# Patient Record
Sex: MALE | Race: BLACK OR AFRICAN AMERICAN | Employment: FULL TIME | URBAN - METROPOLITAN AREA
[De-identification: names, ages, dates, MRNs, and addresses within clinical notes are randomized per-mention and may not be internally consistent; named-entity substitution may affect disease eponyms.]

---

## 2021-09-01 ENCOUNTER — HOSPITAL ENCOUNTER (EMERGENCY)
Age: 56
Discharge: HOME | End: 2021-09-01
Attending: STUDENT IN AN ORGANIZED HEALTH CARE EDUCATION/TRAINING PROGRAM
Payer: MEDICAID

## 2021-09-01 VITALS
TEMPERATURE: 98 F | DIASTOLIC BLOOD PRESSURE: 86 MMHG | OXYGEN SATURATION: 100 % | SYSTOLIC BLOOD PRESSURE: 143 MMHG | RESPIRATION RATE: 18 BRPM | HEART RATE: 103 BPM

## 2021-09-01 DIAGNOSIS — R41.82 ALTERED MENTAL STATUS, UNSPECIFIED ALTERED MENTAL STATUS TYPE: Primary | ICD-10-CM

## 2021-09-01 PROCEDURE — 99281 EMR DPT VST MAYX REQ PHY/QHP: CPT

## 2021-09-01 NOTE — DISCHARGE INSTRUCTIONS
You presented to the ED with EMS with reported altered mental status that was observed by her mother. You had no signs of altered mental status while in the ED. Able to walk without difficulty. You are offered lab work, EKG and further work-up but you declined. As you are asymptomatic at this time discharge is appropriate. If you have return of symptoms or any other concerning changes in your health please return to the ED for further evaluation.

## 2021-09-01 NOTE — ED PROVIDER NOTES
Patient is a 78-year-old male presenting to the ED with EMS after his mother called EMS due to patient's altered mental status. Patient ANO x4 in the ED. Denies any pain or other concerns. Patient states he wants to go home. Patient's mother was contacted via phone and stated that he had an episode of confusion and unresponsiveness although he was awake at the time. Patient appears at baseline at this time    The history is provided by the patient and a parent. Altered mental status   This is a new problem. The current episode started less than 1 hour ago. The problem has been resolved. Associated symptoms include confusion and somnolence. Mental status baseline is normal.  Risk factors: Recent hospitalization. His past medical history does not include seizures, hypertension or head trauma. History reviewed. No pertinent past medical history. No past surgical history on file. History reviewed. No pertinent family history. Social History     Socioeconomic History    Marital status: Not on file     Spouse name: Not on file    Number of children: Not on file    Years of education: Not on file    Highest education level: Not on file   Occupational History    Not on file   Tobacco Use    Smoking status: Not on file   Substance and Sexual Activity    Alcohol use: Not on file    Drug use: Not on file    Sexual activity: Not on file   Other Topics Concern    Not on file   Social History Narrative    Not on file     Social Determinants of Health     Financial Resource Strain:     Difficulty of Paying Living Expenses:    Food Insecurity:     Worried About Running Out of Food in the Last Year:     [address] in the Last Year:    Transportation Needs:     Lack of Transportation (Medical):      Lack of Transportation (Non-Medical):    Physical Activity:     Days of Exercise per Week:     Minutes of Exercise per Session:    Stress:     Feeling of Stress :    Social Connections:     Frequency of Communication with Friends and Family:     Frequency of Social Gatherings with Friends and Family:     Attends Alev[employer] Services:     Active Member of Clubs or Organizations:     Attends Club or Organization Meetings:     Marital Status:    Intimate Partner Violence:     Fear of Current or Ex-Partner:     Emotionally Abused:     Physically Abused:     Sexually Abused: ALLERGIES: Patient has no known allergies. Review of Systems   Constitutional: Negative. HENT: Negative. Eyes: Negative. Respiratory: Negative. Cardiovascular: Negative. Gastrointestinal: Negative. Endocrine: Negative. Genitourinary: Negative. Musculoskeletal: Negative. Skin: Negative. Allergic/Immunologic: Negative. Neurological: Negative. Hematological: Negative. Psychiatric/Behavioral: Positive for confusion. Vitals:    09/01/21 1350   BP: (!) 143/86   Pulse: (!) 103   Resp: 18   Temp: 98 °F (36.7 °C)   SpO2: 100%            Physical Exam  Vitals and nursing note reviewed. Constitutional:       General: He is not in acute distress. Appearance: Normal appearance. HENT:      Head: Normocephalic and atraumatic. Right Ear: External ear normal.      Left Ear: External ear normal.      Nose: Nose normal.      Mouth/Throat:      Mouth: Mucous membranes are moist.      Pharynx: Oropharynx is clear. No posterior oropharyngeal erythema. Eyes:      Extraocular Movements: Extraocular movements intact. Conjunctiva/sclera: Conjunctivae normal.   Cardiovascular:      Rate and Rhythm: Normal rate. Pulses: Normal pulses. Heart sounds: Normal heart sounds. Pulmonary:      Effort: Pulmonary effort is normal.      Breath sounds: Normal breath sounds. Chest:      Chest wall: No deformity or tenderness. Abdominal:      General: Abdomen is flat. There is no distension. Tenderness: There is no abdominal tenderness.    Musculoskeletal:         General: No deformity or signs of injury. Normal range of motion. Cervical back: Normal range of motion and neck supple. No tenderness. Skin:     General: Skin is warm and dry. Capillary Refill: Capillary refill takes less than 2 seconds. Neurological:      General: No focal deficit present. Mental Status: He is alert and oriented to person, place, and time. Cranial Nerves: No cranial nerve deficit. Sensory: No sensory deficit. Motor: No weakness. Gait: Gait normal.   Psychiatric:         Mood and Affect: Mood normal.         Behavior: Behavior normal.          MDM     Differential diagnosis: Altered mental status, near syncope, confusion    MEDICATIONS GIVEN:  Medications - No data to display    MDM: Patient is a 57-year-old male presenting to the ED with EMS for reported confusion/altered mental status with symptoms resolved in the ED with patient refusing further blood work, CT scans, chest x-rays or other work-up. Patient is stable alert and oriented x4, afebrile and able to make his own decisions. As patient's symptoms have completely resolved and there is no focal deficits patient is appropriate for discharge with no requirement for AMA. Patient encouraged to return to ED if symptoms return. Patient's plan discussed with mother and she stated agreement although she would prefer that he get a work-up. Key Discharge Instructions: You presented to the ED with EMS with reported altered mental status that was observed by her mother. You had no signs of altered mental status while in the ED. Able to walk without difficulty. You are offered lab work, EKG and further work-up but you declined. As you are asymptomatic at this time discharge is appropriate. If you have return of symptoms or any other concerning changes in your health please return to the ED for further evaluation. ED Impression:    ICD-10-CM ICD-9-CM    1.  Altered mental status, unspecified altered mental status type  R41.82 780.97     Reported by mother, now resolved        Disposition: Discharged      Procedures

## 2021-09-01 NOTE — ED TRIAGE NOTES
Patient arrived via EMS with possible syncopal episode. Pin point pupils. Sleeping Aroused to voice.

## 2024-11-04 ENCOUNTER — HOSPITAL ENCOUNTER (OUTPATIENT)
Facility: HOSPITAL | Age: 59
Setting detail: RECURRING SERIES
End: 2024-11-04
Payer: MEDICAID

## 2024-11-04 NOTE — PROGRESS NOTES
[place] [address]  [address]    OUTPATIENT PHYSICAL THERAPY      11/4/2024:  Edin Faust was not seen on this date for physical therapy for the following reasons:    [x]     Patient called to cancel the visit for the following reasons: rescheduled due to tardiness  []     Patient missed the visit and did not call to cancel.    Valentin Bryan, PT

## 2024-11-07 ENCOUNTER — HOSPITAL ENCOUNTER (OUTPATIENT)
Facility: HOSPITAL | Age: 59
Setting detail: RECURRING SERIES
Discharge: HOME | End: 2024-11-10
Payer: MEDICAID

## 2024-11-07 PROCEDURE — 97161 PT EVAL LOW COMPLEX 20 MIN: CPT

## 2024-11-07 PROCEDURE — 97110 THERAPEUTIC EXERCISES: CPT

## 2024-11-07 NOTE — PROGRESS NOTES
Procedure, Rationale, Specifics   16  17488 Therapeutic Exercise (timed):  increase ROM, strength, coordination, balance, and proprioception to improve patient's ability to progress to PLOF and address remaining functional goals. (see flow sheet as applicable)    Details if applicable:      Access Code: [email or web address]  Exercises  - Supine Lower Trunk Rotation  - 2 x daily - 10 reps  - Supine Bridge  - 2 x daily - 10 reps  - Hooklying Clamshell with Resistance  - 2 x daily - 10 reps  - Seated Lumbar Flexion Stretch  - 2 x daily - 10 reps         Details if applicable:           Details if applicable:           Details if applicable:           Details if applicable:     16     Total Total           [x]  Patient Education billed concurrently with other procedures   [x] Review HEP    [] Progressed/Changed HEP, detail:    [] Other detail:         Pain Level at end of session (0-10 scale): 6    Plan of Care / Statement of Necessity for Physical Therapy Services     Assessment / ortega information:  Mr. Faust presents with a multi-year, chronic history of LBP and knee pain that has gradually worsened over the last year and prevented him from full function at work and with ADL activities like walking, stairs, and lifting any objects. Knee pain is consistent with patellofemoral pain syndrome, denies radicular back pain and red flag back symptoms. Began gentle exercise program with emphasis on lumbar mobility and hip strength, well tolerated in clinic and added to HEP. Patient is good candidate for PT to reduce pain and functional limitations, improve functional mobility, and return to PLOF.    Evaluation Complexity:  History:  MEDIUM  Complexity : 1-2 comorbidities / personal factors will impact the outcome/ POC ; Examination:  MEDIUM Complexity : 3 Standardized tests and measures addressin body structure, function, activity limitation and / or participation in recreation  ;Presentation:  LOW Complexity : Stable, uncomplicated

## 2024-11-14 ENCOUNTER — HOSPITAL ENCOUNTER (OUTPATIENT)
Facility: HOSPITAL | Age: 59
Setting detail: RECURRING SERIES
End: 2024-11-14
Payer: MEDICAID

## 2024-11-14 NOTE — PROGRESS NOTES
[address]  [address]    OUTPATIENT PHYSICAL THERAPY      11/14/2024:  Edin Faust was not seen on this date for physical therapy for the following reasons:    []     Patient called to cancel the visit for the following reasons:   [x]     Patient missed the visit and did not call to cancel.    Valentin Bryan, PT

## 2024-11-21 ENCOUNTER — HOSPITAL ENCOUNTER (OUTPATIENT)
Facility: HOSPITAL | Age: 59
Setting detail: RECURRING SERIES
Discharge: HOME | End: 2024-11-24
Payer: MEDICAID

## 2024-11-21 PROCEDURE — 97110 THERAPEUTIC EXERCISES: CPT

## 2024-11-21 NOTE — PROGRESS NOTES
PHYSICAL THERAPY - DAILY TREATMENT NOTE (updated 3/23)      Date: 2024          Patient Name:  Edin Faust :  1965   Medical   Diagnosis:  Other low back pain [M54.59] Treatment Diagnosis:  M54.59  OTHER LOWER BACK PAIN    Referral Source:  Pablo Saab PA Insurance:   Payor: [facility] MEDICAID / Plan: [facility] CARDINAL CARE / Product Type: *No Product type* /                     Patient  verified yes     Visit #   Current  / Total 2 12   Time   In / Out 1445 1500   Total Treatment Time 15   Total Timed Codes 1         SUBJECTIVE    Pain Level (0-10 scale): achy, 3/10    Any medication changes, allergies to medications, adverse drug reactions, diagnosis change, or new procedure performed?: [x] No    [] Yes (see summary sheet for update)  Medications: Verified on Patient Summary List    Subjective functional status/changes:     \"The base of my back aches if I sit or lie too long. I get my exercises in a little bit, I do them off and on but try twice a day.\"    OBJECTIVE      Therapeutic Procedures:  Tx Min Billable or 1:1 Min (if diff from Tx Min) Procedure, Rationale, Specifics   15  15876 Therapeutic Exercise (timed):  increase ROM, strength, coordination, balance, and proprioception to improve patient's ability to progress to PLOF and address remaining functional goals. (see flow sheet as applicable)     Details if applicable:    Access Code: [email or web address]  Exercises  - Modified Bayron Stretch  - 2 x daily - 60s hold  - Supine Lower Trunk Rotation  - 2 x daily - 10 reps  - Supine Bridge  - 2 x daily - 10 reps  - Clamshell  - 2 x daily - 10 reps  - Supine 90/90 Alternating Toe Touch  - 2 x daily - 12 reps  - Seated Lumbar Flexion Stretch  - 2 x daily - 10 reps          Details if applicable:           Details if applicable:           Details if applicable:            Details if applicable:     15     Total Total       [x]  Patient Education billed concurrently with other

## 2024-11-26 ENCOUNTER — HOSPITAL ENCOUNTER (OUTPATIENT)
Facility: HOSPITAL | Age: 59
Setting detail: RECURRING SERIES
Discharge: HOME | End: 2024-11-29
Payer: MEDICAID

## 2024-11-26 PROCEDURE — 97140 MANUAL THERAPY 1/> REGIONS: CPT

## 2024-11-26 PROCEDURE — 97110 THERAPEUTIC EXERCISES: CPT

## 2024-11-26 NOTE — PROGRESS NOTES
PHYSICAL THERAPY - DAILY TREATMENT NOTE (updated 3/23)      Date: 2024          Patient Name:  Edin Faust :  1965   Medical   Diagnosis:  Other low back pain [M54.59] Treatment Diagnosis:  M54.59  OTHER LOWER BACK PAIN    Referral Source:  Pablo Saab PA Insurance:   Payor: [facility] MEDICAID / Plan: [facility] CARDINAL CARE / Product Type: *No Product type* /                     Patient  verified yes     Visit #   Current  / Total 3 12   Time   In / Out 1406    Total Treatment Time    Total Timed Codes          SUBJECTIVE    Pain Level (0-10 scale): achy, 310    Any medication changes, allergies to medications, adverse drug reactions, diagnosis change, or new procedure performed?: [x] No    [] Yes (see summary sheet for update)  Medications: Verified on Patient Summary List    Subjective functional status/changes:     \"Last time I was here, the night, I got 5 or 6 sharp pains in my back. Then they went away.\"    OBJECTIVE      Therapeutic Procedures:  Tx Min Billable or 1:1 Min (if diff from Tx Min) Procedure, Rationale, Specifics   15  56413 Therapeutic Exercise (timed):  increase ROM, strength, coordination, balance, and proprioception to improve patient's ability to progress to PLOF and address remaining functional goals. (see flow sheet as applicable)     Details if applicable:    Access Code: [email or web address]  Exercises  - Modified Bayron Stretch  - 2 x daily - 60s hold  - Supine Lower Trunk Rotation  - 2 x daily - 10 reps  - Supine Bridge  - 2 x daily - 10 reps  - Clamshell  - 2 x daily - 10 reps  - Supine 90/90 Alternating Toe Touch  - 2 x daily - 12 reps  - Seated Lumbar Flexion Stretch  - 2 x daily - 10 reps          Details if applicable:           Details if applicable:           Details if applicable:            Details if applicable:     15     Total Total       [x]  Patient Education billed concurrently with other procedures   [x] Review HEP    []

## 2024-12-03 ENCOUNTER — HOSPITAL ENCOUNTER (OUTPATIENT)
Facility: HOSPITAL | Age: 59
Setting detail: RECURRING SERIES
End: 2024-12-03
Payer: MEDICAID

## 2024-12-03 NOTE — PROGRESS NOTES
[address]  [address]    OUTPATIENT PHYSICAL THERAPY      12/3/2024:  Edin Faust was not seen on this date for physical therapy for the following reasons:    [x]     Patient called to cancel the visit for the following reasons: schedule  []     Patient missed the visit and did not call to cancel.    Zainab Dan, PTA

## 2024-12-10 ENCOUNTER — HOSPITAL ENCOUNTER (OUTPATIENT)
Facility: HOSPITAL | Age: 59
Setting detail: RECURRING SERIES
Discharge: HOME | End: 2024-12-13
Payer: MEDICAID

## 2024-12-10 PROCEDURE — 97110 THERAPEUTIC EXERCISES: CPT

## 2024-12-10 PROCEDURE — 97140 MANUAL THERAPY 1/> REGIONS: CPT

## 2024-12-10 NOTE — PROGRESS NOTES
PHYSICAL THERAPY - DAILY TREATMENT NOTE (updated 3/23)      Date: 12/10/2024          Patient Name:  Edin aFust :  1965   Medical   Diagnosis:  Other low back pain [M54.59] Treatment Diagnosis:  M54.59  OTHER LOWER BACK PAIN    Referral Source:  Pablo Saab PA Insurance:   Payor: [facility] MEDICAID / Plan: [facility] CARDINAL CARE / Product Type: *No Product type* /                     Patient  verified yes     Visit #   Current  / Total 4 12   Time   In / Out 1410 1431   Total Treatment Time 25   Total Timed Codes 2         SUBJECTIVE    Pain Level (0-10 scale): tight    Any medication changes, allergies to medications, adverse drug reactions, diagnosis change, or new procedure performed?: [x] No    [] Yes (see summary sheet for update)  Medications: Verified on Patient Summary List    Subjective functional status/changes:     Pt reports after last visit he had pain on opposite side of his back - \"transfer of pain.\" Has not been super consistent with exercises.     OBJECTIVE      Therapeutic Procedures:  Tx Min Billable or 1:1 Min (if diff from Tx Min) Procedure, Rationale, Specifics   10  53987 Manual Therapy (timed):  decrease pain, increase ROM, and decrease trigger points to improve patient's ability to progress to PLOF and address remaining functional goals.  The manual therapy interventions were performed at a separate and distinct time from the therapeutic activities interventions . (see flow sheet as applicable)     Details if applicable:    STM to lumbar paraspinals,    15   42659 Therapeutic Exercise (timed):  increase ROM, strength, coordination, balance, and proprioception to improve patient's ability to progress to PLOF and address remaining functional goals. (see flow sheet as applicable)     Details if applicable:    Access Code: [email or web address]  Exercises  - Modified Bayron Stretch  - 2 x daily - 60s hold  - Sidelying Open Book Thoracic Lumbar Rotation and Extension  -

## 2024-12-18 ENCOUNTER — HOSPITAL ENCOUNTER (OUTPATIENT)
Facility: HOSPITAL | Age: 59
Setting detail: RECURRING SERIES
Discharge: HOME | End: 2024-12-21
Payer: MEDICAID

## 2024-12-18 PROCEDURE — 97110 THERAPEUTIC EXERCISES: CPT | Performed by: PHYSICAL THERAPIST

## 2024-12-18 NOTE — PROGRESS NOTES
10 reps  - Supine Bridge  - 2 x daily - 2 sets - 10 reps  - Hooklying Isometric Clamshell  - 2 x daily - 2 sets - 10 reps  - Supine 90/90 Alternating Toe Touch  - 2 x daily - 15 reps  - Seated Long Arc Quad  - 2 x daily - 2 sets - 10 reps   29     Total Total       [x]  Patient Education billed concurrently with other procedures   [x] Review HEP    [] Progressed/Changed HEP, detail:    [] Other detail:         Other Objective/Functional Measures      Pain Level at end of session (0-10 scale): 1/10      Assessment   Patient continues to have soft tissue restrictions, core control, activity tolerance, muscle endurance, and strength deficits that are causing functional limitations with prolonged standing and stair negotiation. Updated and reviewed HEP and importance of performance to benefit most from physical therapy. Patient will continue to benefit form PT intervention to allow patient to reach his self-selected functional goals.    Patient will continue to benefit from skilled PT / OT services to modify and progress therapeutic interventions, analyze and address functional mobility deficits, analyze and address ROM deficits, analyze and address strength deficits, analyze and address soft tissue restrictions, and analyze and cue for proper movement patterns to address functional deficits and attain remaining goals.    Progress toward goals / Updated goals:  []  See Progress Note/Recertification    Short Term Goals: To be accomplished in 4 weeks  Pt will be independent and compliant with initial HEP without cuing  Pt will increase lumbar extension to within 25% of population norms to increase functional ability  Pt will experience worst LBP of 4/10 to allow for increased work  Pt will lift 10lb from ground to shoulder height without increase in LBP  Long Term Goals: To be accomplished in 12 treatments  Pt will be independent and compliant with final HEP  Pt will increase all lumbar motions to within 10% of population

## 2025-01-21 ENCOUNTER — HOSPITAL ENCOUNTER (OUTPATIENT)
Facility: HOSPITAL | Age: 60
Setting detail: RECURRING SERIES
Discharge: HOME | End: 2025-01-24
Payer: MEDICAID

## 2025-01-21 PROCEDURE — 97110 THERAPEUTIC EXERCISES: CPT | Performed by: PHYSICAL THERAPIST

## 2025-01-21 NOTE — PROGRESS NOTES
PHYSICAL THERAPY - DAILY TREATMENT NOTE (updated 3/23)      Date: 2025          Patient Name:  Edin Faust :  1965   Medical   Diagnosis:  Other low back pain [M54.59] Treatment Diagnosis:  M54.59  OTHER LOWER BACK PAIN    Referral Source:  Pablo Saab PA Insurance:   Payor: [facility] MEDICAID / Plan: [facility] CARDINAL CARE / Product Type: *No Product type* /                     Patient  verified yes     Visit #   Current  / Total 6 12   Time   In / Out 1405 1434   Total Treatment Time 29   Total Timed Codes 2         SUBJECTIVE    Pain Level (0-10 scale): tight    Any medication changes, allergies to medications, adverse drug reactions, diagnosis change, or new procedure performed?: [x] No    [] Yes (see summary sheet for update)  Medications: Verified on Patient Summary List    Subjective functional status/changes:     \"I still have trouble standing for a while, but I was able to cook without it bothering me.\"     OBJECTIVE      Therapeutic Procedures:  Tx Min Billable or 1:1 Min (if diff from Tx Min) Procedure, Rationale, Specifics   NT  50543 Manual Therapy (timed):  decrease pain, increase ROM, and decrease trigger points to improve patient's ability to progress to PLOF and address remaining functional goals.  The manual therapy interventions were performed at a separate and distinct time from the therapeutic activities interventions . (see flow sheet as applicable)     Details if applicable:    STM to lumbar paraspinals   29   87831 Therapeutic Exercise (timed):  increase ROM, strength, coordination, balance, and proprioception to improve patient's ability to progress to PLOF and address remaining functional goals. (see flow sheet as applicable)     Details if applicable:    Access Code: [email or web address]    Exercises  - Open Book - 10 reps  - Supine Bridge - 2 sets - 10 reps  - Hooklying Isometric Clamshell Red band - 2 sets - 10 reps  - Pelvic tilt with Alternating Toe

## 2025-02-13 ENCOUNTER — HOSPITAL ENCOUNTER (OUTPATIENT)
Facility: HOSPITAL | Age: 60
Setting detail: RECURRING SERIES
Discharge: HOME | End: 2025-02-16
Payer: MEDICAID

## 2025-02-13 PROCEDURE — 97110 THERAPEUTIC EXERCISES: CPT | Performed by: PHYSICAL THERAPIST

## 2025-02-13 NOTE — PROGRESS NOTES
PHYSICAL THERAPY - DAILY TREATMENT NOTE (updated 3/23)      Date: 2025          Patient Name:  Edin Faust :  1965   Medical   Diagnosis:  Other low back pain [M54.59] Treatment Diagnosis:  M54.59  OTHER LOWER BACK PAIN    Referral Source:  Pablo Saab PA Insurance:   Payor: [facility] MEDICAID / Plan: [facility] CARDINAL CARE / Product Type: *No Product type* /                     Patient  verified yes     Visit #   Current  / Total 7 12   Time   In / Out 1434 1500   Total Treatment Time 26   Total Timed Codes 2         SUBJECTIVE    Pain Level (0-10 scale): 4/10     Any medication changes, allergies to medications, adverse drug reactions, diagnosis change, or new procedure performed?: [x] No    [] Yes (see summary sheet for update)  Medications: Verified on Patient Summary List    Subjective functional status/changes:     \"It's coming along. I can't say I'm better or worse.\"     OBJECTIVE      Therapeutic Procedures:  Tx Min Billable or 1:1 Min (if diff from Tx Min) Procedure, Rationale, Specifics   NT  86384 Manual Therapy (timed):  decrease pain, increase ROM, and decrease trigger points to improve patient's ability to progress to PLOF and address remaining functional goals.  The manual therapy interventions were performed at a separate and distinct time from the therapeutic activities interventions . (see flow sheet as applicable)     Details if applicable:    STM to lumbar paraspinals   26   32042 Therapeutic Exercise (timed):  increase ROM, strength, coordination, balance, and proprioception to improve patient's ability to progress to PLOF and address remaining functional goals. (see flow sheet as applicable)     Details if applicable:    Access Code: [email or web address]    Exercises  - (NT) Open Book - 10 reps  - Supine Bridge - 2 sets - 10 reps  - Hooklying Isometric Clamshell green band - 2 sets - 10 reps  - Pelvic tilt with Alternating Toe Touch - 20 reps  - Side stepping